# Patient Record
Sex: FEMALE | Race: WHITE | NOT HISPANIC OR LATINO | Employment: OTHER | URBAN - METROPOLITAN AREA
[De-identification: names, ages, dates, MRNs, and addresses within clinical notes are randomized per-mention and may not be internally consistent; named-entity substitution may affect disease eponyms.]

---

## 2023-10-09 NOTE — PROGRESS NOTES
Assessment/Plan:  Calcium 1200-1500mg + 800-1000 IU Vit D daily unless otherwise directed. Exercise 150 minutes per week minimum including weight bearing exercises. No further paps after age 72 as long as there has been adequate normal paps completed, no history of OMARI 2  or a more severe pap diagnosis in the last 20 years. Annual mammogram and monthly breast self exam recommended . Colonoscopy- per Dr Mary Figueroa                Diagnoses and all orders for this visit:    Encounter for gynecological examination without abnormal finding    Encounter for screening mammogram for malignant neoplasm of breast  -     Mammo screening bilateral w 3d & cad; Future    FH: breast cancer [facility]   Comments:  follows with Dr Radha Taylor every 5 months    Other orders  -     albuterol (PROVENTIL HFA,VENTOLIN HFA) 90 mcg/act inhaler; 1 puff as needed Inhalation every 4 hrs as needed  -     aspirin (ECOTRIN LOW STRENGTH) 81 mg EC tablet; Take 81 mg by mouth daily  -     atenolol (TENORMIN) 25 mg tablet; 1/2 tablet Orally Once a day and 1/2 tab prn heart racing once more per day  -     denosumab (Prolia) 60 mg/mL  -     doxepin (SINEquan) 10 mg capsule; 1 capsule at bedtime twice weekly Orally at bedtime for 30 days  -     famotidine (PEPCID) 40 MG tablet; Take 40 mg by mouth every 12 (twelve) hours  -     LORazepam (ATIVAN) 0.5 mg tablet; TAKE 1/2  TABLETS DAILY AT BEDTIME FOR INSOMNIA OR EXTREME ANXIETY  -     Magnesium Oxide 400 MG CAPS; Take 250 capsules by mouth daily  -     simvastatin (ZOCOR) 20 mg tablet; TAKE 1 TABLET EVERY DAY IN THE EVENING BY MOUTH daily at bedtime Orally 90 days  -     Multiple Vitamins-Minerals (MULTI FOR HER 50+ PO); every 24 hours          Subjective:      Patient ID: Lila Allison is a 74 y.o. female. New?former  pt here for annual gyn  . Previous hysterectomy Following with urology for h/o kidney stone she says she is having bladder pain and bilateral flank or kidney pain.  She gets up at night to void. She does drink a lot of water during the day. Urine shows leukocytes and blood. Cystoscopy in 2021 was negative. Had recent [address] which showed kidney stone but CT abd/pelvis did not She is having cystoscope again in a few weeks She gets right upper quadrant ultrasounds on a yearly basis through Dr. Amanda Jacobson to evaluate her liver ? Hemangioma But states something seen on CT so recommending MRI She is nervous  Sister just dx with hepatocellular cancer following dx Hep c she acquired through blood transfusions for brain tumor. The following portions of the patient's history were reviewed and updated as appropriate: allergies, current medications, past family history, past medical history, past social history, past surgical history and problem list.    Review of Systems   Constitutional: Negative for fatigue and unexpected weight change. Gastrointestinal: Negative for abdominal distention, abdominal pain, constipation and diarrhea. Genitourinary: Negative for difficulty urinating, dyspareunia, dysuria, frequency, genital sores, menstrual problem, pelvic pain, urgency, vaginal bleeding, vaginal discharge and vaginal pain. Neurological: Negative for headaches. Psychiatric/Behavioral: Negative. Negative for dysphoric mood. The patient is not nervous/anxious. Objective:      /80 (BP Location: Left arm, Patient Position: Sitting, Cuff Size: Standard)   Ht 5' 6" (1.676 m)   Wt 65.3 kg (144 lb)   BMI 23.24 kg/m²          Physical Exam  Vitals and nursing note reviewed. Constitutional:       General: She is not in acute distress. Appearance: Normal appearance. HENT:      Head: Normocephalic and atraumatic. Pulmonary:      Effort: Pulmonary effort is normal.   Chest:   Breasts:     Breasts are symmetrical.      Right: Normal. No mass, nipple discharge, skin change or tenderness. Left: Normal. No mass, nipple discharge, skin change or tenderness.    Abdominal: General: There is no distension. Palpations: Abdomen is soft. Tenderness: There is no abdominal tenderness. There is no guarding or rebound. Genitourinary:     General: Normal vulva. Exam position: Lithotomy position. Labia:         Right: No rash, tenderness, lesion or injury. Left: No rash, tenderness, lesion or injury. Urethra: No prolapse, urethral pain, urethral swelling or urethral lesion. Vagina: Normal. No erythema or lesions. Uterus: Absent. Adnexa:         Right: No mass or tenderness. Left: No mass or tenderness. Rectum: External hemorrhoid present. Musculoskeletal:         General: Normal range of motion. Lymphadenopathy:      Upper Body:      Right upper body: No axillary adenopathy. Left upper body: No axillary adenopathy. Lower Body: No right inguinal adenopathy. No left inguinal adenopathy. Skin:     General: Skin is warm and dry. Neurological:      Mental Status: She is alert and oriented to person, place, and time. Psychiatric:         Mood and Affect: Mood normal.         Behavior: Behavior normal.         Thought Content:  Thought content normal.         Judgment: Judgment normal.

## 2023-10-10 ENCOUNTER — OFFICE VISIT (OUTPATIENT)
Dept: OBGYN CLINIC | Facility: CLINIC | Age: 73
End: 2023-10-10
Payer: MEDICARE

## 2023-10-10 VITALS
DIASTOLIC BLOOD PRESSURE: 80 MMHG | SYSTOLIC BLOOD PRESSURE: 120 MMHG | BODY MASS INDEX: 23.14 KG/M2 | WEIGHT: 144 LBS | HEIGHT: 66 IN

## 2023-10-10 DIAGNOSIS — Z01.419 ENCOUNTER FOR GYNECOLOGICAL EXAMINATION WITHOUT ABNORMAL FINDING: Primary | ICD-10-CM

## 2023-10-10 DIAGNOSIS — Z80.3 FH: BREAST CANCER: ICD-10-CM

## 2023-10-10 DIAGNOSIS — Z12.31 ENCOUNTER FOR SCREENING MAMMOGRAM FOR MALIGNANT NEOPLASM OF BREAST: ICD-10-CM

## 2023-10-10 PROCEDURE — G0101 CA SCREEN;PELVIC/BREAST EXAM: HCPCS | Performed by: NURSE PRACTITIONER

## 2023-10-10 RX ORDER — SIMVASTATIN 20 MG
TABLET ORAL
COMMUNITY

## 2023-10-10 RX ORDER — ATENOLOL 25 MG/1
TABLET ORAL
COMMUNITY

## 2023-10-10 RX ORDER — DENOSUMAB 60 MG/ML
INJECTION SUBCUTANEOUS
COMMUNITY

## 2023-10-10 RX ORDER — CALCIUM CARBONATE/VITAMIN D3 500-10/5ML
250 LIQUID (ML) ORAL DAILY
COMMUNITY

## 2023-10-10 RX ORDER — LORAZEPAM 0.5 MG/1
TABLET ORAL
COMMUNITY
Start: 2023-09-11

## 2023-10-10 RX ORDER — DOXEPIN HYDROCHLORIDE 10 MG/1
CAPSULE ORAL
COMMUNITY
Start: 2023-07-07

## 2023-10-10 RX ORDER — FAMOTIDINE 40 MG/1
40 TABLET, FILM COATED ORAL EVERY 12 HOURS
COMMUNITY
Start: 2023-09-05

## 2023-10-10 RX ORDER — ASPIRIN 81 MG/1
81 TABLET ORAL DAILY
COMMUNITY

## 2023-10-10 RX ORDER — ALBUTEROL SULFATE 90 UG/1
AEROSOL, METERED RESPIRATORY (INHALATION)
COMMUNITY

## 2023-10-10 NOTE — PATIENT INSTRUCTIONS
Calcium 1200-1500mg + 800-1000 IU Vit D daily unless otherwise directed. Exercise 150 minutes per week minimum including weight bearing exercises. No further paps after age 72 as long as there has been adequate normal paps completed, no history of OMARI 2  or a more severe pap diagnosis in the last 20 years. Annual mammogram and monthly breast self exam recommended .    Colonoscopy- per Dr Davalos

## 2025-07-22 ENCOUNTER — OFFICE VISIT (OUTPATIENT)
Age: 75
End: 2025-07-22

## 2025-07-22 ENCOUNTER — HOSPITAL ENCOUNTER (EMERGENCY)
Age: 75
Discharge: HOME | End: 2025-07-23
Attending: EMERGENCY MEDICINE
Payer: MEDICARE

## 2025-07-22 VITALS — BODY MASS INDEX: 22.44 KG/M2 | SYSTOLIC BLOOD PRESSURE: 143 MMHG | WEIGHT: 139 LBS | DIASTOLIC BLOOD PRESSURE: 76 MMHG

## 2025-07-22 VITALS
BODY MASS INDEX: 22.02 KG/M2 | SYSTOLIC BLOOD PRESSURE: 120 MMHG | WEIGHT: 137 LBS | DIASTOLIC BLOOD PRESSURE: 70 MMHG | HEIGHT: 66 IN

## 2025-07-22 DIAGNOSIS — R33.9 URINE RETENTION: Primary | ICD-10-CM

## 2025-07-22 DIAGNOSIS — R33.9 URINARY RETENTION: Primary | ICD-10-CM

## 2025-07-22 DIAGNOSIS — R33.8 ACUTE URINARY RETENTION: Primary | ICD-10-CM

## 2025-07-22 PROCEDURE — NURSE: Performed by: OBSTETRICS & GYNECOLOGY

## 2025-07-22 PROCEDURE — 99283 EMERGENCY DEPT VISIT LOW MDM: CPT

## 2025-07-22 PROCEDURE — 99024 POSTOP FOLLOW-UP VISIT: CPT | Performed by: OBSTETRICS & GYNECOLOGY

## 2025-07-22 RX ORDER — ONDANSETRON 4 MG/1
4 TABLET, FILM COATED ORAL EVERY 8 HOURS PRN
COMMUNITY
Start: 2025-05-27

## 2025-07-22 NOTE — ASSESSMENT & PLAN NOTE
Patient has had prolonged urinary retention s/p anterior colporrhaphy. She has a history of recurrent UTIs. She had passed voiding trials several times post-op but had repeated urinary retention episodes. Today PVR by straight cath was 40ml. She has Flomax at home and I advised her to take it as needed. Also has Ativan for muscle relaxant.

## 2025-07-22 NOTE — PROGRESS NOTES
Pt here for void trial. Balloon deflated and 16fr catheter removed w/o difficulty.  Dark yellow urine in the bag.  Pt to RTO this pm w/Dr Ng.  Pt to drink 3 glasses of water before returns this pm.  Pt tolerated procedure well and left in good condition.

## 2025-07-22 NOTE — PROGRESS NOTES
Name: Cora Vázquez      : 1950      MRN: [id number]  Encounter Provider: Harsha Ng MD  Encounter Date: 2025   Encounter department: [facility] UROGYNECOLOGY [facility]  :  Assessment & Plan  Urinary retention  Patient has had prolonged urinary retention s/p anterior colporrhaphy. She has a history of recurrent UTIs. She had passed voiding trials several times post-op but had repeated urinary retention episodes. Today PVR by straight cath was 40ml. She has Flomax at home and I advised her to take it as needed. Also has Ativan for muscle relaxant.              History of Present Illness   HPI  Cora Vázquez is a 75 y.o. female who presents for postop.       Review of Systems       Objective   /76 (BP Location: Left arm, Patient Position: Sitting)   Wt 63 kg (139 lb)   BMI 22.44 kg/m²      Physical Exam  PVR by straight cath: 40ml

## 2025-07-23 ENCOUNTER — TELEPHONE (OUTPATIENT)
Age: 75
End: 2025-07-23

## 2025-07-23 VITALS
HEIGHT: 66 IN | TEMPERATURE: 97.9 F | HEART RATE: 70 BPM | WEIGHT: 138.89 LBS | DIASTOLIC BLOOD PRESSURE: 60 MMHG | RESPIRATION RATE: 20 BRPM | SYSTOLIC BLOOD PRESSURE: 113 MMHG | OXYGEN SATURATION: 96 % | BODY MASS INDEX: 22.32 KG/M2

## 2025-07-23 NOTE — DISCHARGE INSTRUCTIONS
Please contact your urologist tomorrow to inform them Montes catheter need to be placed and to arrange close outpatient follow-up  Return immediately for any new or worsening symptoms of concern

## 2025-07-23 NOTE — TELEPHONE ENCOUNTER
Patient called just wanting Dr. Ng know that she was in the ED last night 7/22/25 due to her being pain from her Guerrero being removed. They inserted a guerrero back in at the ED.

## 2025-07-23 NOTE — ED PROVIDER NOTES
ED Disposition       None          Assessment & Plan       Medical Decision Making  75-year-old female past medical history of osteopenia had recent bladder lift surgery has been having recurrent episodes of urinary retension.  Seen by her urologist today and had Guerrero catheter removed.  Patient has been unable to urinate since.  Bladder scan performed by ED staff showing 500 cc of urine.  ED staff instructed place Guerrero catheter at this time.    ED Course as of 07/23/25 0010   Wed Jul 23, 2025   0009 Guerrero catheter placed with about 400 cc of urine output and feeling significantly improved       Medications - No data to display    ED Risk Strat Scores                    No data recorded                            History of Present Illness       Chief Complaint   Patient presents with   • Urinary Retention     Patient had bladder lift surgery on 07/07/2025 and states that she is having trouble urinating. She has been to the specialist 5 times for a voiding trial, guerrero was removed today at 10am and she has not been able to urinate since.       Past Medical History[1]   Past Surgical History[2]   Family History[3]   Social History[4]   E-Cigarette/Vaping   • E-Cigarette Use Never User       E-Cigarette/Vaping Substances      I have reviewed and agree with the history as documented.     Chief complaint inability urinate.  Patient had bladder lift surgery.  Is been following up with Dr. Ng from urology.  Family reports patient has had to have Guerrero catheter placed at least 4 times now for reoccurring urine retention.  Saw urologist in the office today and it remove the Guerrero catheter.  Patient has not been able to urinate since about 10 AM this morning.  Reportedly has lots of pain discomfort when she feels the urge to urinate.  Had tried taking Azo in the past but felt that this was making her sick.  Took Flomax today.  There is been no vomiting, diarrhea, fever.          Review of Systems   Constitutional:   Negative for fever.   HENT:  Negative for sore throat.    Eyes:  Negative for discharge.   Respiratory:  Negative for shortness of breath.    Cardiovascular:  Negative for chest pain.   Gastrointestinal:  Negative for abdominal pain.   Genitourinary:  Positive for dysuria.   Musculoskeletal:  Negative for back pain.   Skin:  Negative for rash.   Neurological:  Negative for headaches.   Hematological:  Does not bruise/bleed easily.           Objective       ED Triage Vitals [07/22/25 2310]   Temperature Pulse Blood Pressure Respirations SpO2 Patient Position - Orthostatic VS   97.9 °F (36.6 °C) 84 137/78 20 97 % Sitting      Temp Source Heart Rate Source BP Location FiO2 (%) Pain Score    Temporal Monitor Right arm -- 9      Vitals      Date and Time Temp Pulse SpO2 Resp BP Pain Score FACES Pain Rating User   07/22/25 2310 97.9 °F (36.6 °C) 84 97 % 20 137/78 9 -- RG            Physical Exam  Constitutional:       General: She is not in acute distress.  HENT:      Mouth/Throat:      Mouth: Mucous membranes are moist.     Eyes:      Conjunctiva/sclera: Conjunctivae normal.       Cardiovascular:      Rate and Rhythm: Normal rate and regular rhythm.   Pulmonary:      Effort: Pulmonary effort is normal.      Breath sounds: Normal breath sounds.   Abdominal:      General: There is distension.      Tenderness: There is abdominal tenderness.     Musculoskeletal:         General: Normal range of motion.      Cervical back: Normal range of motion and neck supple.     Skin:     General: Skin is dry.     Neurological:      Mental Status: Mental status is at baseline.     Psychiatric:         Mood and Affect: Mood normal.         Results Reviewed       None            No orders to display       Procedures    ED Medication and Procedure Management   Prior to Admission Medications   Prescriptions Last Dose Informant Patient Reported? Taking?   ASPIRIN 81 PO   Yes No   Sig: Take 1 tablet by mouth in the morning   LORazepam (ATIVAN)  0.5 mg tablet   Yes No   Magnesium Oxide 400 MG CAPS   Yes No   Sig: Take 250 capsules by mouth in the morning.   Multiple Vitamins-Minerals (MULTI FOR HER 50+ PO)   Yes No   Sig: every 24 hours   albuterol (PROVENTIL HFA,VENTOLIN HFA) 90 mcg/act inhaler   Yes No   aspirin (ECOTRIN LOW STRENGTH) 81 mg EC tablet   Yes No   Sig: Take 81 mg by mouth in the morning.   atenolol (TENORMIN) 25 mg tablet   Yes No   denosumab (Prolia) 60 mg/mL   Yes No   Patient not taking: Reported on 2025   doxepin (SINEquan) 10 mg capsule   Yes No   Si capsule at bedtime twice weekly Orally at bedtime for 30 days   Patient not taking: Reported on 2025   famotidine (PEPCID) 40 MG tablet   Yes No   Sig: Take 40 mg by mouth every 12 (twelve) hours   ondansetron (ZOFRAN) 4 mg tablet   Yes No   Sig: Take 4 mg by mouth every 8 (eight) hours as needed 5 days   simvastatin (ZOCOR) 20 mg tablet   Yes No   Sig: TAKE 1 TABLET EVERY DAY IN THE EVENING BY MOUTH daily at bedtime Orally 90 days   Patient taking differently: Take 5 mg by mouth daily at bedtime      Facility-Administered Medications: None     Patient's Medications   Discharge Prescriptions    No medications on file     No discharge procedures on file.  ED SEPSIS DOCUMENTATION                [1]  Past Medical History:  Diagnosis Date   • Osteopenia    [2]  Past Surgical History:  Procedure Laterality Date   • BLADDER SURGERY  2025   • CHOLECYSTECTOMY     • HYSTERECTOMY     • NOSE SURGERY     [3]  Family History  Problem Relation Name Age of Onset   • Breast cancer Mother     • Heart attack Father     • Liver cancer Sister     • Other Sister          Brain tumor   • Diabetes Sister     • Breast cancer Maternal Grandmother     [4]  Social History  Tobacco Use   • Smoking status: Never     Passive exposure: Never   • Smokeless tobacco: Never   Vaping Use   • Vaping status: Never Used   Substance Use Topics   • Alcohol use: Not Currently   • Drug use: Yes      Bryce Vásquez  MD  07/23/25 0012

## 2025-07-24 NOTE — TELEPHONE ENCOUNTER
Patient would like to know provider's recs. On follow up. Had to have indwelling catheter replaced in ED for urinary retention. Patient wondering how long she should wait to be seen for voiding trial.

## 2025-07-28 ENCOUNTER — DOCUMENTATION (OUTPATIENT)
Age: 75
End: 2025-07-28

## 2025-07-28 ENCOUNTER — OFFICE VISIT (OUTPATIENT)
Age: 75
End: 2025-07-28
Payer: MEDICARE

## 2025-07-28 ENCOUNTER — NURSE TRIAGE (OUTPATIENT)
Age: 75
End: 2025-07-28

## 2025-07-28 VITALS
WEIGHT: 138 LBS | DIASTOLIC BLOOD PRESSURE: 71 MMHG | BODY MASS INDEX: 22.18 KG/M2 | SYSTOLIC BLOOD PRESSURE: 106 MMHG | HEIGHT: 66 IN

## 2025-07-28 DIAGNOSIS — M62.89 HIGH-TONE PELVIC FLOOR DYSFUNCTION: ICD-10-CM

## 2025-07-28 DIAGNOSIS — N39.0 RECURRENT UTI: ICD-10-CM

## 2025-07-28 DIAGNOSIS — M62.89 HIGH-TONE PELVIC FLOOR DYSFUNCTION: Primary | ICD-10-CM

## 2025-07-28 DIAGNOSIS — R39.9 UTI SYMPTOMS: Primary | ICD-10-CM

## 2025-07-28 PROCEDURE — G2211 COMPLEX E/M VISIT ADD ON: HCPCS | Performed by: OBSTETRICS & GYNECOLOGY

## 2025-07-28 PROCEDURE — 99214 OFFICE O/P EST MOD 30 MIN: CPT | Performed by: OBSTETRICS & GYNECOLOGY

## 2025-07-28 RX ORDER — LORAZEPAM 0.5 MG/1
0.5 TABLET ORAL
Qty: 20 TABLET | Refills: 0 | Status: SHIPPED | OUTPATIENT
Start: 2025-07-28 | End: 2025-08-06 | Stop reason: SDUPTHER

## 2025-07-28 RX ORDER — BACLOFEN 10 MG/1
10 TABLET ORAL 3 TIMES DAILY
Qty: 21 TABLET | Refills: 0 | Status: SHIPPED | OUTPATIENT
Start: 2025-07-28 | End: 2025-08-04

## 2025-07-28 RX ORDER — CEFDINIR 300 MG/1
300 CAPSULE ORAL EVERY 12 HOURS SCHEDULED
Qty: 14 CAPSULE | Refills: 0 | Status: SHIPPED | OUTPATIENT
Start: 2025-07-28 | End: 2025-08-04

## 2025-07-28 RX ORDER — ESTRADIOL 0.1 MG/G
1 CREAM VAGINAL DAILY
COMMUNITY

## 2025-07-28 NOTE — TELEPHONE ENCOUNTER
When went home on the 7/22 was only going small amounts.  Then started to get pains in bladder and went to ER.  She has had the guerrero since.  She will come in this am to do another void trial.

## 2025-08-01 ENCOUNTER — TELEPHONE (OUTPATIENT)
Age: 75
End: 2025-08-01

## 2025-08-02 LAB
APPEARANCE UR: ABNORMAL
BACTERIA UR QL AUTO: ABNORMAL /HPF
BILIRUB UR QL STRIP: NEGATIVE
COLOR UR: YELLOW
GLUCOSE UR QL STRIP: NEGATIVE
HGB UR QL STRIP: ABNORMAL
HYALINE CASTS #/AREA URNS LPF: ABNORMAL /LPF
KETONES UR QL STRIP: NEGATIVE
LEUKOCYTE ESTERASE UR QL STRIP: ABNORMAL
NITRITE UR QL STRIP: NEGATIVE
PH UR STRIP: 6.5 [PH] (ref 5–8)
PROT UR QL STRIP: ABNORMAL
RBC #/AREA URNS HPF: ABNORMAL /HPF
SP GR UR STRIP: 1.02 (ref 1–1.03)
SQUAMOUS #/AREA URNS HPF: ABNORMAL /HPF
WBC #/AREA URNS HPF: ABNORMAL /HPF

## 2025-08-04 ENCOUNTER — OFFICE VISIT (OUTPATIENT)
Age: 75
End: 2025-08-04
Payer: MEDICARE

## 2025-08-04 ENCOUNTER — APPOINTMENT (OUTPATIENT)
Age: 75
End: 2025-08-04
Payer: MEDICARE

## 2025-08-04 ENCOUNTER — RESULTS FOLLOW-UP (OUTPATIENT)
Age: 75
End: 2025-08-04

## 2025-08-04 DIAGNOSIS — N39.0 URINARY TRACT INFECTION WITHOUT HEMATURIA, SITE UNSPECIFIED: Primary | ICD-10-CM

## 2025-08-04 DIAGNOSIS — R32 URINARY INCONTINENCE, UNSPECIFIED TYPE: Primary | ICD-10-CM

## 2025-08-04 PROCEDURE — 87086 URINE CULTURE/COLONY COUNT: CPT

## 2025-08-04 PROCEDURE — 87186 SC STD MICRODIL/AGAR DIL: CPT

## 2025-08-04 PROCEDURE — 99212 OFFICE O/P EST SF 10 MIN: CPT | Performed by: OBSTETRICS & GYNECOLOGY

## 2025-08-04 PROCEDURE — 87077 CULTURE AEROBIC IDENTIFY: CPT

## 2025-08-05 ENCOUNTER — OFFICE VISIT (OUTPATIENT)
Age: 75
End: 2025-08-05
Payer: MEDICARE

## 2025-08-05 VITALS — DIASTOLIC BLOOD PRESSURE: 70 MMHG | SYSTOLIC BLOOD PRESSURE: 146 MMHG

## 2025-08-05 DIAGNOSIS — T83.511D URINARY TRACT INFECTION ASSOCIATED WITH INDWELLING URETHRAL CATHETER, SUBSEQUENT ENCOUNTER: Primary | ICD-10-CM

## 2025-08-05 DIAGNOSIS — N39.0 URINARY TRACT INFECTION ASSOCIATED WITH INDWELLING URETHRAL CATHETER, SUBSEQUENT ENCOUNTER: Primary | ICD-10-CM

## 2025-08-05 PROCEDURE — 51700 IRRIGATION OF BLADDER: CPT | Performed by: OBSTETRICS & GYNECOLOGY

## 2025-08-05 RX ORDER — ONDANSETRON 4 MG/1
4 TABLET, ORALLY DISINTEGRATING ORAL EVERY 8 HOURS PRN
COMMUNITY
Start: 2025-07-05

## 2025-08-06 ENCOUNTER — TELEPHONE (OUTPATIENT)
Age: 75
End: 2025-08-06

## 2025-08-06 DIAGNOSIS — M62.89 HIGH-TONE PELVIC FLOOR DYSFUNCTION: ICD-10-CM

## 2025-08-06 DIAGNOSIS — N30.00 ACUTE CYSTITIS WITHOUT HEMATURIA: Primary | ICD-10-CM

## 2025-08-06 LAB — BACTERIA UR CULT: ABNORMAL

## 2025-08-06 RX ORDER — LORAZEPAM 0.5 MG/1
0.5 TABLET ORAL
Qty: 20 TABLET | Refills: 0 | Status: SHIPPED | OUTPATIENT
Start: 2025-08-06 | End: 2025-08-26

## 2025-08-06 RX ORDER — CEFDINIR 300 MG/1
300 CAPSULE ORAL EVERY 12 HOURS SCHEDULED
Qty: 14 CAPSULE | Refills: 0 | Status: SHIPPED | OUTPATIENT
Start: 2025-08-06 | End: 2025-08-13

## 2025-08-11 ENCOUNTER — OFFICE VISIT (OUTPATIENT)
Age: 75
End: 2025-08-11
Payer: MEDICARE

## 2025-08-12 ENCOUNTER — PROCEDURE VISIT (OUTPATIENT)
Age: 75
End: 2025-08-12

## 2025-08-13 ENCOUNTER — PROCEDURE VISIT (OUTPATIENT)
Age: 75
End: 2025-08-13

## 2025-08-14 ENCOUNTER — TELEPHONE (OUTPATIENT)
Age: 75
End: 2025-08-14

## 2025-08-14 ENCOUNTER — OFFICE VISIT (OUTPATIENT)
Age: 75
End: 2025-08-14
Payer: MEDICARE

## 2025-08-14 DIAGNOSIS — R39.9 UTI SYMPTOMS: Primary | ICD-10-CM

## 2025-08-15 ENCOUNTER — PROCEDURE VISIT (OUTPATIENT)
Age: 75
End: 2025-08-15

## 2025-08-15 DIAGNOSIS — R39.9 UTI SYMPTOMS: Primary | ICD-10-CM

## 2025-08-18 ENCOUNTER — TRANSCRIBE ORDERS (OUTPATIENT)
Dept: ADMINISTRATIVE | Facility: HOSPITAL | Age: 75
End: 2025-08-18

## 2025-08-18 ENCOUNTER — RESULTS FOLLOW-UP (OUTPATIENT)
Age: 75
End: 2025-08-18

## 2025-08-18 VITALS — DIASTOLIC BLOOD PRESSURE: 65 MMHG | SYSTOLIC BLOOD PRESSURE: 138 MMHG

## 2025-08-18 DIAGNOSIS — Z12.31 ENCOUNTER FOR SCREENING MAMMOGRAM FOR MALIGNANT NEOPLASM OF BREAST: Primary | ICD-10-CM

## 2025-08-21 ENCOUNTER — TELEPHONE (OUTPATIENT)
Age: 75
End: 2025-08-21

## 2025-08-21 DIAGNOSIS — R39.9 UTI SYMPTOMS: Primary | ICD-10-CM

## 2025-08-22 ENCOUNTER — TRANSCRIBE ORDERS (OUTPATIENT)
Dept: LAB | Facility: HOSPITAL | Age: 75
End: 2025-08-22

## 2025-08-22 ENCOUNTER — TELEPHONE (OUTPATIENT)
Age: 75
End: 2025-08-22

## 2025-08-22 DIAGNOSIS — E78.00 PURE HYPERCHOLESTEROLEMIA, UNSPECIFIED: ICD-10-CM

## 2025-08-22 DIAGNOSIS — I47.10 SUPRAVENTRICULAR TACHYCARDIA, UNSPECIFIED (HCC): ICD-10-CM

## 2025-08-22 DIAGNOSIS — E78.00 HYPERCHOLESTEROLEMIA: Primary | ICD-10-CM

## 2025-08-22 DIAGNOSIS — R39.9 UTI SYMPTOMS: Primary | ICD-10-CM
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
## 2025-08-22 LAB
CHOLEST SERPL-MCNC: 238 MG/DL (ref ?–200)
HDLC SERPL-MCNC: 47 MG/DL
LDLC SERPL CALC-MCNC: 157 MG/DL (ref 0–100)
TRIGL SERPL-MCNC: 168 MG/DL (ref ?–150)

## 2025-08-22 PROCEDURE — 87186 SC STD MICRODIL/AGAR DIL: CPT

## 2025-08-22 PROCEDURE — 87086 URINE CULTURE/COLONY COUNT: CPT

## 2025-08-22 PROCEDURE — 80061 LIPID PANEL: CPT

## 2025-08-22 PROCEDURE — 87077 CULTURE AEROBIC IDENTIFY: CPT

## 2025-08-22 RX ORDER — CEFDINIR 300 MG/1
300 CAPSULE ORAL EVERY 12 HOURS SCHEDULED
Qty: 14 CAPSULE | Refills: 0 | Status: SHIPPED | OUTPATIENT
Start: 2025-08-22 | End: 2025-08-26

## 2025-08-25 LAB — BACTERIA UR CULT: ABNORMAL

## 2025-08-26 PROBLEM — Z16.12 UTI DUE TO EXTENDED-SPECTRUM BETA LACTAMASE (ESBL) PRODUCING ESCHERICHIA COLI: Status: ACTIVE | Noted: 2025-07-28

## 2025-08-26 PROBLEM — K21.9 GERD (GASTROESOPHAGEAL REFLUX DISEASE): Status: ACTIVE | Noted: 2025-08-26

## 2025-08-26 PROBLEM — F41.1 GAD (GENERALIZED ANXIETY DISORDER): Status: ACTIVE | Noted: 2025-08-26
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
## 2025-08-26 PROBLEM — B96.29 UTI DUE TO EXTENDED-SPECTRUM BETA LACTAMASE (ESBL) PRODUCING ESCHERICHIA COLI: Status: ACTIVE | Noted: 2025-07-28

## 2025-08-26 PROBLEM — E03.9 HYPOTHYROIDISM: Status: ACTIVE | Noted: 2025-08-26

## 2025-08-26 PROBLEM — S22.32XA LEFT RIB FRACTURE: Status: ACTIVE | Noted: 2025-08-26
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
## 2025-08-27 PROBLEM — R33.9 URINARY RETENTION: Status: RESOLVED | Noted: 2025-07-22 | Resolved: 2025-08-27

## 2025-08-27 PROBLEM — B96.89 URINARY TRACT INFECTION DUE TO ESBL KLEBSIELLA: Status: ACTIVE | Noted: 2025-07-28
